# Patient Record
Sex: MALE | Race: WHITE | NOT HISPANIC OR LATINO | URBAN - METROPOLITAN AREA
[De-identification: names, ages, dates, MRNs, and addresses within clinical notes are randomized per-mention and may not be internally consistent; named-entity substitution may affect disease eponyms.]

---

## 2018-04-18 ENCOUNTER — OFFICE (OUTPATIENT)
Dept: URBAN - METROPOLITAN AREA CLINIC 10 | Facility: CLINIC | Age: 78
End: 2018-04-18
Payer: OTHER GOVERNMENT

## 2018-04-18 VITALS
HEART RATE: 75 BPM | SYSTOLIC BLOOD PRESSURE: 156 MMHG | WEIGHT: 229 LBS | HEIGHT: 77 IN | DIASTOLIC BLOOD PRESSURE: 90 MMHG

## 2018-04-18 DIAGNOSIS — I10 ESSENTIAL (PRIMARY) HYPERTENSION: ICD-10-CM

## 2018-04-18 DIAGNOSIS — K22.70 BARRETT'S ESOPHAGUS WITHOUT DYSPLASIA: ICD-10-CM

## 2018-04-18 DIAGNOSIS — K21.9 GASTRO-ESOPHAGEAL REFLUX DISEASE WITHOUT ESOPHAGITIS: ICD-10-CM

## 2018-04-18 DIAGNOSIS — R12 HEARTBURN: ICD-10-CM

## 2018-04-18 PROCEDURE — 99204 OFFICE O/P NEW MOD 45 MIN: CPT | Performed by: INTERNAL MEDICINE

## 2018-04-18 RX ORDER — RANITIDINE 150 MG/1
TABLET ORAL
Qty: 90 | Refills: 3 | Status: COMPLETED
Start: 2018-04-18 | End: 2019-11-27

## 2018-06-29 ENCOUNTER — AMBULATORY SURGICAL CENTER (OUTPATIENT)
Dept: URBAN - METROPOLITAN AREA SURGERY 1 | Facility: SURGERY | Age: 78
End: 2018-06-29

## 2018-06-29 ENCOUNTER — AMBULATORY SURGICAL CENTER (OUTPATIENT)
Dept: URBAN - METROPOLITAN AREA SURGERY 1 | Facility: SURGERY | Age: 78
End: 2018-06-29
Payer: MEDICARE

## 2018-06-29 VITALS
OXYGEN SATURATION: 92 % | OXYGEN SATURATION: 99 % | DIASTOLIC BLOOD PRESSURE: 76 MMHG | RESPIRATION RATE: 16 BRPM | HEART RATE: 65 BPM | DIASTOLIC BLOOD PRESSURE: 93 MMHG | HEART RATE: 68 BPM | WEIGHT: 229 LBS | SYSTOLIC BLOOD PRESSURE: 151 MMHG | HEART RATE: 77 BPM | HEART RATE: 67 BPM | SYSTOLIC BLOOD PRESSURE: 136 MMHG | OXYGEN SATURATION: 92 % | OXYGEN SATURATION: 94 % | DIASTOLIC BLOOD PRESSURE: 93 MMHG | DIASTOLIC BLOOD PRESSURE: 80 MMHG | OXYGEN SATURATION: 94 % | SYSTOLIC BLOOD PRESSURE: 150 MMHG | SYSTOLIC BLOOD PRESSURE: 162 MMHG | HEART RATE: 77 BPM | HEART RATE: 68 BPM | HEART RATE: 63 BPM | DIASTOLIC BLOOD PRESSURE: 72 MMHG | HEIGHT: 77 IN | DIASTOLIC BLOOD PRESSURE: 72 MMHG | HEART RATE: 67 BPM | DIASTOLIC BLOOD PRESSURE: 76 MMHG | TEMPERATURE: 97.8 F | DIASTOLIC BLOOD PRESSURE: 80 MMHG | TEMPERATURE: 98.1 F | OXYGEN SATURATION: 93 % | HEART RATE: 65 BPM | SYSTOLIC BLOOD PRESSURE: 162 MMHG | OXYGEN SATURATION: 93 % | OXYGEN SATURATION: 99 % | SYSTOLIC BLOOD PRESSURE: 136 MMHG | OXYGEN SATURATION: 96 % | WEIGHT: 229 LBS | SYSTOLIC BLOOD PRESSURE: 135 MMHG | HEART RATE: 63 BPM | SYSTOLIC BLOOD PRESSURE: 135 MMHG | DIASTOLIC BLOOD PRESSURE: 74 MMHG | SYSTOLIC BLOOD PRESSURE: 139 MMHG | DIASTOLIC BLOOD PRESSURE: 68 MMHG | HEART RATE: 64 BPM | TEMPERATURE: 98.1 F | SYSTOLIC BLOOD PRESSURE: 150 MMHG | HEIGHT: 77 IN | DIASTOLIC BLOOD PRESSURE: 68 MMHG | SYSTOLIC BLOOD PRESSURE: 151 MMHG | DIASTOLIC BLOOD PRESSURE: 74 MMHG | TEMPERATURE: 97.8 F | HEART RATE: 64 BPM | SYSTOLIC BLOOD PRESSURE: 139 MMHG | RESPIRATION RATE: 16 BRPM | OXYGEN SATURATION: 96 %

## 2018-06-29 DIAGNOSIS — K44.9 DIAPHRAGMATIC HERNIA WITHOUT OBSTRUCTION OR GANGRENE: ICD-10-CM

## 2018-06-29 DIAGNOSIS — Z48.815 ENCOUNTER FOR SURGICAL AFTERCARE FOLLOWING SURGERY ON THE DI: ICD-10-CM

## 2018-06-29 DIAGNOSIS — K22.70 BARRETT'S ESOPHAGUS WITHOUT DYSPLASIA: ICD-10-CM

## 2018-06-29 PROCEDURE — 43235 EGD DIAGNOSTIC BRUSH WASH: CPT | Performed by: INTERNAL MEDICINE

## 2018-06-29 PROCEDURE — G8918 PT W/O PREOP ORDER IV AB PRO: HCPCS | Performed by: INTERNAL MEDICINE

## 2018-06-29 PROCEDURE — G8907 PT DOC NO EVENTS ON DISCHARG: HCPCS | Performed by: INTERNAL MEDICINE

## 2018-06-29 NOTE — SERVICENOTES
Given age, comorbidities and minimal findings on endoscopy will discuss need for further surveillance/repeat upper endoscopy given today's limitations

## 2019-01-02 ENCOUNTER — OFFICE (OUTPATIENT)
Dept: URBAN - METROPOLITAN AREA CLINIC 10 | Facility: CLINIC | Age: 79
End: 2019-01-02
Payer: OTHER GOVERNMENT

## 2019-01-02 VITALS
DIASTOLIC BLOOD PRESSURE: 87 MMHG | HEART RATE: 70 BPM | HEIGHT: 77 IN | WEIGHT: 233 LBS | SYSTOLIC BLOOD PRESSURE: 173 MMHG

## 2019-01-02 DIAGNOSIS — R12 HEARTBURN: ICD-10-CM

## 2019-01-02 DIAGNOSIS — K21.9 GASTRO-ESOPHAGEAL REFLUX DISEASE WITHOUT ESOPHAGITIS: ICD-10-CM

## 2019-01-02 PROCEDURE — 99213 OFFICE O/P EST LOW 20 MIN: CPT | Performed by: INTERNAL MEDICINE

## 2019-01-02 RX ORDER — RANITIDINE 300 MG/1
TABLET ORAL
Qty: 180 | Refills: 3 | Status: COMPLETED
Start: 2019-01-02 | End: 2019-11-27

## 2019-01-02 NOTE — SERVICEHPINOTES
Mr. Camejo is 78 years old. 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 He has been taking dexilant for years. He has good results with combination of dexilant and ranitidine. 06/2018-EGD for Molina's: 2 cm hiatal hernia. No obvious Molina's esophagus. No nodules or worrisome features at the GE junction. Large amount of retained food in proximal stomach limiting visualization and limiting exam length. Normal-appearing jejunal mucosa. Changes consistent with previous [place] IIHe is not interested in repeat colonoscopy. He states it was negative other than sigmoid diverticulosis. He denies family history of colon cancer. BR

## 2020-01-02 ENCOUNTER — OFFICE (OUTPATIENT)
Dept: URBAN - METROPOLITAN AREA CLINIC 10 | Facility: CLINIC | Age: 80
End: 2020-01-02

## 2020-01-02 VITALS
HEART RATE: 56 BPM | DIASTOLIC BLOOD PRESSURE: 102 MMHG | WEIGHT: 240 LBS | SYSTOLIC BLOOD PRESSURE: 176 MMHG | HEIGHT: 77 IN

## 2020-01-02 DIAGNOSIS — K21.9 GASTRO-ESOPHAGEAL REFLUX DISEASE WITHOUT ESOPHAGITIS: ICD-10-CM

## 2020-01-02 DIAGNOSIS — K22.70 BARRETT'S ESOPHAGUS WITHOUT DYSPLASIA: ICD-10-CM

## 2020-01-02 PROCEDURE — 99213 OFFICE O/P EST LOW 20 MIN: CPT | Performed by: INTERNAL MEDICINE

## 2020-01-02 RX ORDER — OMEPRAZOLE 20 MG/1
CAPSULE, DELAYED RELEASE ORAL
Qty: 90 | Refills: 3 | Status: ACTIVE
Start: 2020-01-02

## 2020-01-02 RX ORDER — FAMOTIDINE 20 MG/1
40 TABLET, FILM COATED ORAL
Qty: 90 | Refills: 3 | Status: COMPLETED
Start: 2019-12-02 | End: 2020-01-02